# Patient Record
Sex: FEMALE | Race: OTHER | NOT HISPANIC OR LATINO
[De-identification: names, ages, dates, MRNs, and addresses within clinical notes are randomized per-mention and may not be internally consistent; named-entity substitution may affect disease eponyms.]

---

## 2017-06-08 PROBLEM — Z00.00 ENCOUNTER FOR PREVENTIVE HEALTH EXAMINATION: Status: ACTIVE | Noted: 2017-06-08

## 2017-06-08 PROBLEM — M54.2 NECK PAIN: Status: ACTIVE | Noted: 2017-06-08

## 2017-06-08 PROBLEM — M54.5 LOW BACK PAIN: Status: ACTIVE | Noted: 2017-06-08

## 2017-06-08 RX ORDER — MESALAMINE 1000 MG/1
SUPPOSITORY RECTAL
Refills: 0 | Status: ACTIVE | COMMUNITY

## 2017-06-08 RX ORDER — PANTOPRAZOLE 40 MG/1
40 TABLET, DELAYED RELEASE ORAL
Refills: 0 | Status: ACTIVE | COMMUNITY

## 2017-06-09 ENCOUNTER — APPOINTMENT (OUTPATIENT)
Dept: NEUROSURGERY | Facility: CLINIC | Age: 47
End: 2017-06-09

## 2017-06-09 VITALS
HEART RATE: 80 BPM | DIASTOLIC BLOOD PRESSURE: 72 MMHG | SYSTOLIC BLOOD PRESSURE: 118 MMHG | HEIGHT: 59 IN | OXYGEN SATURATION: 98 % | BODY MASS INDEX: 20.76 KG/M2 | WEIGHT: 103 LBS

## 2017-06-09 DIAGNOSIS — M54.5 LOW BACK PAIN: ICD-10-CM

## 2017-06-09 DIAGNOSIS — M54.2 CERVICALGIA: ICD-10-CM

## 2017-06-09 DIAGNOSIS — K21.9 GASTRO-ESOPHAGEAL REFLUX DISEASE W/OUT ESOPHAGITIS: ICD-10-CM

## 2021-02-09 DIAGNOSIS — M48.061 SPINAL STENOSIS, LUMBAR REGION WITHOUT NEUROGENIC CLAUDICATION: ICD-10-CM

## 2021-02-11 ENCOUNTER — RESULT REVIEW (OUTPATIENT)
Age: 51
End: 2021-02-11

## 2021-02-11 ENCOUNTER — APPOINTMENT (OUTPATIENT)
Dept: SPINE | Facility: CLINIC | Age: 51
End: 2021-02-11
Payer: COMMERCIAL

## 2021-02-11 ENCOUNTER — OUTPATIENT (OUTPATIENT)
Dept: OUTPATIENT SERVICES | Facility: HOSPITAL | Age: 51
LOS: 1 days | End: 2021-02-11
Payer: COMMERCIAL

## 2021-02-11 ENCOUNTER — APPOINTMENT (OUTPATIENT)
Dept: SPINE | Facility: CLINIC | Age: 51
End: 2021-02-11

## 2021-02-11 VITALS
TEMPERATURE: 98 F | BODY MASS INDEX: 21.2 KG/M2 | WEIGHT: 108 LBS | DIASTOLIC BLOOD PRESSURE: 83 MMHG | SYSTOLIC BLOOD PRESSURE: 150 MMHG | RESPIRATION RATE: 14 BRPM | OXYGEN SATURATION: 99 % | HEART RATE: 67 BPM | HEIGHT: 60 IN

## 2021-02-11 DIAGNOSIS — M54.9 DORSALGIA, UNSPECIFIED: ICD-10-CM

## 2021-02-11 DIAGNOSIS — M51.26 OTHER INTERVERTEBRAL DISC DISPLACEMENT, LUMBAR REGION: ICD-10-CM

## 2021-02-11 DIAGNOSIS — M54.12 DISEASE OF SPINAL CORD, UNSPECIFIED: ICD-10-CM

## 2021-02-11 DIAGNOSIS — J45.909 UNSPECIFIED ASTHMA, UNCOMPLICATED: ICD-10-CM

## 2021-02-11 DIAGNOSIS — G95.9 DISEASE OF SPINAL CORD, UNSPECIFIED: ICD-10-CM

## 2021-02-11 DIAGNOSIS — Z78.9 OTHER SPECIFIED HEALTH STATUS: ICD-10-CM

## 2021-02-11 DIAGNOSIS — M51.04 INTERVERTEBRAL DISC DISORDERS WITH MYELOPATHY, THORACIC REGION: ICD-10-CM

## 2021-02-11 PROCEDURE — 72082 X-RAY EXAM ENTIRE SPI 2/3 VW: CPT | Mod: 26

## 2021-02-11 PROCEDURE — 72114 X-RAY EXAM L-S SPINE BENDING: CPT

## 2021-02-11 PROCEDURE — 99204 OFFICE O/P NEW MOD 45 MIN: CPT

## 2021-02-11 PROCEDURE — 72082 X-RAY EXAM ENTIRE SPI 2/3 VW: CPT

## 2021-02-11 PROCEDURE — 72052 X-RAY EXAM NECK SPINE 6/>VWS: CPT

## 2021-02-11 PROCEDURE — 99072 ADDL SUPL MATRL&STAF TM PHE: CPT

## 2021-02-11 PROCEDURE — 72052 X-RAY EXAM NECK SPINE 6/>VWS: CPT | Mod: 26,59

## 2021-02-11 NOTE — PHYSICAL EXAM
[General Appearance - Alert] : alert [General Appearance - In No Acute Distress] : in no acute distress [General Appearance - Well Nourished] : well nourished [General Appearance - Well-Appearing] : healthy appearing [Oriented To Time, Place, And Person] : oriented to person, place, and time [Impaired Insight] : insight and judgment were intact [Affect] : the affect was normal [Memory Recent] : recent memory was not impaired [Person] : oriented to person [Place] : oriented to place [Time] : oriented to time [5] : T1 abductor digiti minimi 5/5 [4] : S1 flexor hallucis longus 4/5 [Abnormal Walk] : normal gait [Balance] : balance was intact [4+] : Patella left 4+ [2+] : Ankle jerk left 2+ [No Visual Abnormalities] : no visible abnormailities [Straight-Leg Raise Test - Left] : straight leg raise of the left leg was positive [Straight-Leg Raise Test - Right] : straight leg raise of the right leg was positive [Normal] : normal [Able to toe walk] : the patient was able to toe walk [Able to heel walk] : the patient was able to heel walk [Fraga] : Fraga's sign was not demonstrated

## 2021-02-11 NOTE — REASON FOR VISIT
[New Patient Visit] : a new patient visit [Referred By: _________] : Patient was referred by LESLIE [Spouse] : spouse

## 2021-02-11 NOTE — REVIEW OF SYSTEMS
[Arm Weakness] : arm weakness [Leg Weakness] : leg weakness [Numbness] : numbness [Difficulty Walking] : difficulty walking [As Noted in HPI] : as noted in HPI [Negative] : Endocrine

## 2021-02-11 NOTE — HISTORY OF PRESENT ILLNESS
[de-identified] : Patient is a 50 yo RH F with PMH of asthma, lumbar and cervical disc herniation (h/o L4-[address] Wagner in 2010 @ [place]) presents for neurosurgical evaluation.\par She reports sudden onset of neck/midback to lower back pain after she has mechanical fall on 7/11/2020 (missed steps and fell on 6 stairs, landed on her R shoulder/hip/back without LOC). Pain is described as severe constant dull pain on her posterior neck to b/l arms (L>R) and mid back, lower back to LEFT leg and R hip worsening by standing/walking (cannot walk more than one block) and unable to recall alleviating factor. She endorses numbness on LEFT arm/leg with some weakness on L foot causing mild gait disturbance. She denies saddle anesthesia/BB dysfunction. She ambulates without assistive device and performs ADLs independently. As per patient, she has been tried PT since the fall incidence without improvement and denies any pain medication/injection trials.\par \par She was seen by PCP where Xray L-spine (AP/Lat) and MRI L-spine wo was completed at that time which showed no fracture but L4-5 L paracentral disc herniation.

## 2021-02-11 NOTE — ASSESSMENT
[FreeTextEntry1] : PLAN\par - MRI cervical/thoracic/lumbar spine wo\par - BACS questionnaire\par - Medrol pack/gabapentin (patient refuses any meds at this time. would like to get images first)\par - RTC after MRI to see Dr. Shore

## 2021-02-11 NOTE — DATA REVIEWED
[de-identified] : L-spine in 8/2020 @ [facility] showed L paracentral herniated disc with thecal sac compression [de-identified] : C/T/L spine today in PACS

## 2021-02-14 NOTE — DATA REVIEWED
[de-identified] : L-spine in 8/2020 @ [facility] showed L paracentral herniated disc with thecal sac compression [de-identified] : C/T/L spine today in PACS

## 2021-02-14 NOTE — ASSESSMENT
[FreeTextEntry1] : PLAN\par - MRI cervical/thoracic/lumbar spine wo\par - BACS questionnaire\par - Medrol pack\par - RTC after MRI to see Dr. Shore

## 2021-02-14 NOTE — HISTORY OF PRESENT ILLNESS
[de-identified] : Patient is a 50 yo RH F with PMH of asthma, lumbar and cervical disc herniation (h/o L4-5 microdiskectomy with Dr. Wagner in 2010 @ [place]) presents for neurosurgical evaluation.\par She reports sudden onset of neck/midback to lower back pain after she has mechanical fall on 7/11/2020 (missed steps and fell on 6 stairs, landed on her R shoulder/hip/back without LOC). Pain is described as severe constant dull pain on her posterior neck to b/l arms (L>R) and mid back, lower back to LEFT leg and R hip worsening by standing/walking (cannot walk more than one block) and unable to recall alleviating factor. She endorses numbness on LEFT arm/leg with some weakness on L foot causing mild gait disturbance. She denies saddle anesthesia/BB dysfunction. She ambulates without assistive device and performs ADLs independently. As per patient, she has been tried PT since the fall incidence without improvement and denies any pain medication/injection trials.\par \par She was seen by PCP where Xray L-spine (AP/Lat) and MRI L-spine wo was completed at that time which showed no fracture but L4-5 L paracentral disc herniation.

## 2021-02-14 NOTE — HISTORY OF PRESENT ILLNESS
[de-identified] : Patient is a 50 yo RH F with PMH of asthma, lumbar and cervical disc herniation (h/o L4-5 microdiskectomy with Dr. Wagner in 2010 @ [place]) presents for neurosurgical evaluation.\par She reports sudden onset of neck/midback to lower back pain after she has mechanical fall on 7/11/2020 (missed steps and fell on 6 stairs, landed on her R shoulder/hip/back without LOC). Pain is described as severe constant dull pain on her posterior neck to b/l arms (L>R) and mid back, lower back to LEFT leg and R hip worsening by standing/walking (cannot walk more than one block) and unable to recall alleviating factor. She endorses numbness on LEFT arm/leg with some weakness on L foot causing mild gait disturbance. She denies saddle anesthesia/BB dysfunction. She ambulates without assistive device and performs ADLs independently. As per patient, she has been tried PT since the fall incidence without improvement and denies any pain medication/injection trials.\par \par She was seen by PCP where Xray L-spine (AP/Lat) and MRI L-spine wo was completed at that time which showed no fracture but L4-5 L paracentral disc herniation.

## 2021-02-14 NOTE — DATA REVIEWED
[de-identified] : L-spine in 8/2020 @ [facility] showed L paracentral herniated disc with thecal sac compression [de-identified] : C/T/L spine today in PACS